# Patient Record
Sex: FEMALE | Race: WHITE | URBAN - METROPOLITAN AREA
[De-identification: names, ages, dates, MRNs, and addresses within clinical notes are randomized per-mention and may not be internally consistent; named-entity substitution may affect disease eponyms.]

---

## 2018-12-15 ENCOUNTER — OFFICE VISIT (OUTPATIENT)
Dept: AUDIOLOGY | Facility: CLINIC | Age: 68
End: 2018-12-15
Payer: COMMERCIAL

## 2018-12-15 DIAGNOSIS — H90.3 SENSORINEURAL HEARING LOSS, BILATERAL: Primary | ICD-10-CM

## 2018-12-15 PROCEDURE — 92567 TYMPANOMETRY: CPT | Performed by: AUDIOLOGIST

## 2018-12-15 PROCEDURE — 92557 COMPREHENSIVE HEARING TEST: CPT | Performed by: AUDIOLOGIST

## 2018-12-15 NOTE — PROGRESS NOTES
AUDIOGRAM     Lanette Rangel was referred for testing by No ref. provider found. 11/7/1950  [id number]      Otoscopic Inspection:  both ears: no cerumen    Audiometric Test Results: The patient was tested using air and bone audiometry.   The audiometric th

## 2023-05-15 NOTE — TELEPHONE ENCOUNTER
Patient calling to reschedule her colonoscopy on 5/30. Please call at [phone], informed of the 3-4 week turnaround, thanks.

## 2023-06-12 NOTE — TELEPHONE ENCOUNTER
I called the office. I was told we could fax it to # [phone]    RN - can you please fax? I am working remote and do not have access to a fax today.

## 2023-06-12 NOTE — TELEPHONE ENCOUNTER
GI Staff:   Please fax today's c-scope to Dr. Lopez Point per patient request, Phone: [phone] is office number. I don't have fax number.

## 2024-10-22 NOTE — PROGRESS NOTES
NEW GYN H&P     10/22/2024  12:50 PM    Chief Complaint   Patient presents with    New Patient     Referred by PCP for annual gyne exam   .    HPI: Patient is a 73 year old  LMP age 62's here to establish care - referred by PCP for annual gyne exam and PAP. Reports concerns about vaginal dryness but otherwise no gynecologic concerns. No pelvic pain. No abnormal vaginal discharge or bleeding.     No LMP recorded. Patient is postmenopausal.    OB History    Para Term  AB Living   1 1 1     1   SAB IAB Ectopic Multiple Live Births           1      # Outcome Date GA Lbr Irvin/2nd Weight Sex Type Anes PTL Lv   1 Term     F Caesarean   LARS       GYN hx:    Pap Result Notes: pt not sure of last pap states it was a long time ago  Follow Up Recommendation: Mammo: 2024 at [facility]  CONTRACEPTION: N/A  LAST MAMMOGRAM: 2024      Current Outpatient Medications   Medication Sig Dispense Refill    atorvastatin 10 MG Oral Tab       calcium carb-cholecalciferol 500-10 MG-MCG Oral Chew Tab Chew 1 tablet by mouth daily.      Cholecalciferol (VITAMIN D3) 10 MCG (400 UNIT) Oral Cap Take by mouth.         Past Medical History:    Amenorrhea    Colon polyp    repeat CLN in     Essential hypertension    Hyperlipidemia    Osteoporosis     Past Surgical History:   Procedure Laterality Date           Allergies[1]  Family History   Problem Relation Age of Onset    Hypertension Mother             Breast Cancer Neg     Ovarian Cancer Neg     Uterine Cancer Neg     Colon Cancer Neg      Social History     Socioeconomic History    Marital status:    Tobacco Use    Smoking status: Never    Smokeless tobacco: Never   Vaping Use    Vaping status: Never Used   Substance and Sexual Activity    Alcohol use: Yes     Comment: occ    Drug use: Never    Sexual activity: Not Currently     Social History     Social History Narrative    Not on file       ROS:     Review of Systems:  A comprehensive 10  point ROS was completed. All pertinent positives and negatives noted in the HPI.     /80   Ht 63\"   Wt 187 lb 1.6 oz (84.9 kg)   BMI 33.14 kg/m²     Exam:   GENERAL: well developed, well nourished, in no apparent distress  SKIN: no rashes, no lesions  HEENT: normal  LUNGS: respiration unlabored  CARDIOVASCULAR: no peripheral edema or varicosities, skin warm and dry  BREASTS: bilaterally nontender, no palpable masses, no nipple discharge, no skin changes, no axillary adenopathy  ABDOMEN: Soft, non distended; non tender, no masses  GYNE/:   External Genitalia: normal, no lesions, good perineal support  Urethra: meatus normal   Bladder: well supported  Vagina: thin atrophic mucosa, no lesions, no discharge   Uterus: normal size, mobile, nontender  Cervix: PAP done, sub-optimal visualization of os, no visible lesions or bleeding  Adnexa:normal size, bilaterally nontender, no palpable masses  Cul-de-sac: normal  R/V: normal perineum, no hemorrhoids  EXTREMITIES:  nontender without edema      A/P: Patient is 73 year old female     1. Women's annual routine gynecological examination  - PAP today    2. Atrophic vaginitis  - Start vaginal estradiol cream        10/22/2024  Elsie Do MD                    [1] No Known Allergies